# Patient Record
Sex: MALE | Race: BLACK OR AFRICAN AMERICAN | URBAN - METROPOLITAN AREA
[De-identification: names, ages, dates, MRNs, and addresses within clinical notes are randomized per-mention and may not be internally consistent; named-entity substitution may affect disease eponyms.]

---

## 2020-07-31 VITALS
BODY MASS INDEX: 24.64 KG/M2 | OXYGEN SATURATION: 99 % | HEART RATE: 74 BPM | DIASTOLIC BLOOD PRESSURE: 92 MMHG | HEIGHT: 67 IN | SYSTOLIC BLOOD PRESSURE: 160 MMHG | TEMPERATURE: 98.3 F | WEIGHT: 157 LBS

## 2020-07-31 RX ORDER — ATORVASTATIN CALCIUM 40 MG/1
TABLET, FILM COATED ORAL
COMMUNITY
Start: 2020-07-01

## 2020-07-31 RX ORDER — LISINOPRIL 10 MG/1
TABLET ORAL
COMMUNITY
Start: 2020-07-01

## 2020-07-31 RX ORDER — METOPROLOL TARTRATE 50 MG/1
TABLET ORAL
COMMUNITY
Start: 2020-07-01

## 2020-08-03 LAB — CREATININE, EXTERNAL: 1.2

## 2020-08-27 ENCOUNTER — OFFICE VISIT (OUTPATIENT)
Dept: SURGERY | Age: 60
End: 2020-08-27
Payer: COMMERCIAL

## 2020-08-27 VITALS
TEMPERATURE: 97.6 F | HEART RATE: 77 BPM | DIASTOLIC BLOOD PRESSURE: 89 MMHG | HEIGHT: 67 IN | OXYGEN SATURATION: 97 % | SYSTOLIC BLOOD PRESSURE: 117 MMHG | WEIGHT: 149 LBS | BODY MASS INDEX: 23.39 KG/M2

## 2020-08-27 DIAGNOSIS — K40.90 LEFT INGUINAL HERNIA: Primary | ICD-10-CM

## 2020-08-27 PROCEDURE — 99024 POSTOP FOLLOW-UP VISIT: CPT | Performed by: COLON & RECTAL SURGERY

## 2020-08-27 RX ORDER — OXYCODONE AND ACETAMINOPHEN 5; 325 MG/1; MG/1
TABLET ORAL
COMMUNITY
Start: 2020-08-10 | End: 2020-09-24 | Stop reason: ALTCHOICE

## 2020-08-27 NOTE — PROGRESS NOTES
OFFICE VISIT NOTE    Bianca Smith is a 63 y.o. male who presents to the office today for:    Chief Complaint   Patient presents with    Post OP Follow Up     s/p left inguinal hernia repair 8/10/2020   History of present illness:      Mr. Keith Snider is a 71-year-old gentleman who is status post repair of a left inguinal hernia with mesh approximately 2 weeks ago. He complains of some mild discomfort at the surgical site but otherwise is without complaints. He denies any pain in the testicle on that side. He is urinating without difficulty. He denies any abdominal complaints.       Past Medical History:   Diagnosis Date    Hypercholesterolemia     Hypertension        Past Surgical History:   Procedure Laterality Date    HX HERNIA REPAIR  2018    HX HERNIA REPAIR Left 08/10/2020    Inguinal hernia repair with mesh       Family History   Problem Relation Age of Onset    Diabetes Mother        Social History     Socioeconomic History    Marital status: UNKNOWN     Spouse name: Not on file    Number of children: Not on file    Years of education: Not on file    Highest education level: Not on file   Occupational History    Not on file   Social Needs    Financial resource strain: Not on file    Food insecurity     Worry: Not on file     Inability: Not on file   [employer] needs     Medical: Not on file     Non-medical: Not on file   Tobacco Use    Smoking status: Never Smoker    Smokeless tobacco: Never Used   Substance and Sexual Activity    Alcohol use: Not Currently    Drug use: Never    Sexual activity: Not on file   Lifestyle    Physical activity     Days per week: Not on file     Minutes per session: Not on file    Stress: Not on file   Relationships    Social connections     Talks on phone: Not on file     Gets together: Not on file     Attends Hoahaoism service: Not on file     Active member of club or organization: Not on file     Attends meetings of clubs or organizations: Not on file     Relationship status: Not on file    Intimate partner violence     Fear of current or ex partner: Not on file     Emotionally abused: Not on file     Physically abused: Not on file     Forced sexual activity: Not on file   Other Topics Concern    Not on file   Social History Narrative    Not on file       Allergies   Allergen Reactions    Penicillins Shortness of Breath       Current Outpatient Medications   Medication Sig    oxyCODONE-acetaminophen (PERCOCET) 5-325 mg per tablet TAKE 1 TABLET BY MOUTH EVERY 4 HOURS AS NEEDED FOR PAIN    atorvastatin (LIPITOR) 40 mg tablet TAKE 1 TABLET BY MOUTH ONCE DAILY    lisinopriL (PRINIVIL, ZESTRIL) 10 mg tablet TAKE 1 TABLET BY MOUTH ONCE DAILY    metoprolol tartrate (LOPRESSOR) 50 mg tablet TAKE 1 TABLET BY MOUTH TWICE DAILY     No current facility-administered medications for this visit. Review of Systems   Constitutional: Negative. HENT: Negative. Eyes: Negative. Respiratory: Negative. Cardiovascular: Negative. Gastrointestinal: Negative. Genitourinary: Negative. Musculoskeletal: Negative. Skin: Negative. Neurological: Negative. Endo/Heme/Allergies: Negative. Psychiatric/Behavioral: Negative. /89 (BP 1 Location: Left arm, BP Patient Position: Sitting)   Pulse 77   Temp 97.6 °F (36.4 °C) (Temporal)   Ht 5' 7\" (1.702 m)   Wt 149 lb (67.6 kg)   SpO2 97%   BMI 23.34 kg/m²     Physical Exam  Abdominal:      General: Bowel sounds are normal. There is no distension. Palpations: Abdomen is soft. Tenderness: There is no abdominal tenderness. Comments: Left inguinal hernia repair incision is clean and intact. There is no evidence of persistent or recurrent hernia at rest or on Valsalva. The testicle on the left side is freely mobile and equal in size to the right testicle. Problem List Items Addressed This Visit        Other    Left inguinal hernia - Primary          Assessment and Plan:    Overall Mr. Brynn Parker is doing well following his surgery. I advised her to continue to avoid any heavy lifting. He will follow-up with me in 1 month for routine follow-up.           Shen Winn MD

## 2020-09-24 ENCOUNTER — OFFICE VISIT (OUTPATIENT)
Dept: SURGERY | Age: 60
End: 2020-09-24
Payer: COMMERCIAL

## 2020-09-24 VITALS
DIASTOLIC BLOOD PRESSURE: 78 MMHG | WEIGHT: 154 LBS | HEART RATE: 76 BPM | SYSTOLIC BLOOD PRESSURE: 129 MMHG | OXYGEN SATURATION: 98 % | TEMPERATURE: 98.1 F | BODY MASS INDEX: 24.17 KG/M2 | HEIGHT: 67 IN

## 2020-09-24 DIAGNOSIS — K40.90 LEFT INGUINAL HERNIA: Primary | ICD-10-CM

## 2020-09-24 PROCEDURE — 99024 POSTOP FOLLOW-UP VISIT: CPT | Performed by: COLON & RECTAL SURGERY

## 2020-09-24 NOTE — PROGRESS NOTES
OFFICE VISIT NOTE    Margery Cogan is a 63 y.o. male who presents to the office today for follow-up of repair of left inguinal hernia with mesh on August 7, 2020    Chief Complaint   Patient presents with    Follow-up     Left inguinal hernia repair 8/10/2020   History of present illness:    Mr. Ofelia Dukes comes in today for follow-up status post repair of left inguinal hernia on August 10, 2020. He states that he is increasing his activity he does feel some discomfort at the medial aspect of his repair. He is otherwise without complaints. He denies recurrent hernia. He has no abdominal complaints.       Past Medical History:   Diagnosis Date    Hypercholesterolemia     Hypertension        Past Surgical History:   Procedure Laterality Date    HX HERNIA REPAIR  2018    HX HERNIA REPAIR Left 08/10/2020    Inguinal hernia repair with mesh       Family History   Problem Relation Age of Onset    Diabetes Mother        Social History     Socioeconomic History    Marital status: UNKNOWN     Spouse name: Not on file    Number of children: Not on file    Years of education: Not on file    Highest education level: Not on file   Occupational History    Not on file   Social Needs    Financial resource strain: Not on file    Food insecurity     Worry: Not on file     Inability: Not on file   [employer] needs     Medical: Not on file     Non-medical: Not on file   Tobacco Use    Smoking status: Never Smoker    Smokeless tobacco: Never Used   Substance and Sexual Activity    Alcohol use: Not Currently    Drug use: Never    Sexual activity: Not on file   Lifestyle    Physical activity     Days per week: Not on file     Minutes per session: Not on file    Stress: Not on file   Relationships    Social connections     Talks on phone: Not on file     Gets together: Not on file     Attends Restoration service: Not on file     Active member of club or organization: Not on file     Attends meetings of clubs or organizations: Not on file     Relationship status: Not on file    Intimate partner violence     Fear of current or ex partner: Not on file     Emotionally abused: Not on file     Physically abused: Not on file     Forced sexual activity: Not on file   Other Topics Concern    Not on file   Social History Narrative    Not on file       Allergies   Allergen Reactions    Penicillins Shortness of Breath       Current Outpatient Medications   Medication Sig    atorvastatin (LIPITOR) 40 mg tablet TAKE 1 TABLET BY MOUTH ONCE DAILY    lisinopriL (PRINIVIL, ZESTRIL) 10 mg tablet TAKE 1 TABLET BY MOUTH ONCE DAILY    metoprolol tartrate (LOPRESSOR) 50 mg tablet TAKE 1 TABLET BY MOUTH TWICE DAILY     No current facility-administered medications for this visit. Review of Systems   All other systems reviewed and are negative. /78 (BP 1 Location: Left arm, BP Patient Position: Sitting)   Pulse 76   Temp 98.1 °F (36.7 °C) (Oral)   Ht 5' 7\" (1.702 m)   Wt 154 lb (69.9 kg)   SpO2 98%   BMI 24.12 kg/m²     Physical Exam  Abdominal:      Comments: On examination his left inguinal hernia incision has healed nicely. There is no evidence of recurrent hernia at rest or on Valsalva. The testicle is freely mobile on that side. Problem List Items Addressed This Visit        Other    Left inguinal hernia - Primary          Assessment and Plan:  I told Mr. Gilbert Thorne I am pleased he is doing well. He is to return back to work in the first week of November. He is to come back and see me before then to final clearance.             Kiki Milton MD

## 2020-10-19 VITALS
DIASTOLIC BLOOD PRESSURE: 92 MMHG | WEIGHT: 157 LBS | HEIGHT: 67 IN | TEMPERATURE: 98.3 F | BODY MASS INDEX: 24.64 KG/M2 | OXYGEN SATURATION: 99 % | HEART RATE: 74 BPM | SYSTOLIC BLOOD PRESSURE: 160 MMHG

## 2020-10-19 RX ORDER — OXYCODONE AND ACETAMINOPHEN 5; 325 MG/1; MG/1
TABLET ORAL
COMMUNITY
End: 2020-10-29 | Stop reason: ALTCHOICE

## 2020-10-29 ENCOUNTER — OFFICE VISIT (OUTPATIENT)
Dept: SURGERY | Age: 60
End: 2020-10-29
Payer: COMMERCIAL

## 2020-10-29 VITALS
OXYGEN SATURATION: 99 % | BODY MASS INDEX: 25.27 KG/M2 | DIASTOLIC BLOOD PRESSURE: 80 MMHG | SYSTOLIC BLOOD PRESSURE: 133 MMHG | TEMPERATURE: 96.8 F | WEIGHT: 161 LBS | HEART RATE: 74 BPM | HEIGHT: 67 IN

## 2020-10-29 DIAGNOSIS — K40.90 LEFT INGUINAL HERNIA: Primary | ICD-10-CM

## 2020-10-29 PROCEDURE — 99024 POSTOP FOLLOW-UP VISIT: CPT | Performed by: COLON & RECTAL SURGERY

## 2020-10-29 NOTE — PROGRESS NOTES
OFFICE VISIT NOTE    Liv Davis is a 62 y.o. male who presents to the office today for:    Chief Complaint   Patient presents with    Follow-up     Left inguinal hernia repair 8/10/2020       Mr. Clive Hood comes in today for follow-up status post repair of left inguinal hernia August 7, 2020. He had previously had a repair of a right inguinal hernia approximately 3 years ago. In terms of his left inguinal hernia he has no complaints today. He denies any pain at the site. He denies denies any recurrent hernia. He denies any testicular pain.       Past Medical History:   Diagnosis Date    Hypercholesterolemia     Hypertension        Past Surgical History:   Procedure Laterality Date    HX HERNIA REPAIR  2018    HX HERNIA REPAIR Left 08/10/2020    Inguinal hernia repair with mesh    HX HERNIA REPAIR  2018    HX HERNIA REPAIR  08/04/2017       Family History   Problem Relation Age of Onset    Diabetes Mother     Cancer Father     Hypertension Other        Social History     Socioeconomic History    Marital status: UNKNOWN     Spouse name: Not on file    Number of children: Not on file    Years of education: Not on file    Highest education level: Not on file   Occupational History    Not on file   Social Needs    Financial resource strain: Not on file    Food insecurity     Worry: Not on file     Inability: Not on file   [employer] needs     Medical: Not on file     Non-medical: Not on file   Tobacco Use    Smoking status: Never Smoker    Smokeless tobacco: Never Used   Substance and Sexual Activity    Alcohol use: Not Currently    Drug use: Never    Sexual activity: Not on file   Lifestyle    Physical activity     Days per week: Not on file     Minutes per session: Not on file    Stress: Not on file   Relationships    Social connections     Talks on phone: Not on file Gets together: Not on file     Attends Alevism service: Not on file     Active member of club or organization: Not on file     Attends meetings of clubs or organizations: Not on file     Relationship status: Not on file    Intimate partner violence     Fear of current or ex partner: Not on file     Emotionally abused: Not on file     Physically abused: Not on file     Forced sexual activity: Not on file   Other Topics Concern    Not on file   Social History Narrative    Not on file       Allergies   Allergen Reactions    Penicillins Shortness of Breath       Current Outpatient Medications   Medication Sig    atorvastatin (LIPITOR) 40 mg tablet TAKE 1 TABLET BY MOUTH ONCE DAILY    lisinopriL (PRINIVIL, ZESTRIL) 10 mg tablet TAKE 1 TABLET BY MOUTH ONCE DAILY    metoprolol tartrate (LOPRESSOR) 50 mg tablet TAKE 1 TABLET BY MOUTH TWICE DAILY     No current facility-administered medications for this visit. Review of Systems   All other systems reviewed and are negative. /80 (BP 1 Location: Left arm, BP Patient Position: Sitting)   Pulse 74   Temp 96.8 °F (36 °C) (Skin)   Ht 5' 7\" (1.702 m)   Wt 161 lb (73 kg)   SpO2 99%   BMI 25.22 kg/m²     Physical Exam  Abdominal:      Comments: Left inguinal hernia incision is healed beautifully. There is no evidence of recurrent hernia either at rest or on Valsalva. Problem List Items Addressed This Visit        Other    Left inguinal hernia - Primary          Assessment and Plan:    Josh Christine is done quite well following his surgery. I have cleared him to return to work on 11/2/2020. He will follow-up with me on an as-needed basis.           Jose Lemus MD

## 2022-03-18 PROBLEM — K40.90 LEFT INGUINAL HERNIA: Status: ACTIVE | Noted: 2020-08-27

## 2023-01-27 ENCOUNTER — HOSPITAL ENCOUNTER (EMERGENCY)
Age: 63
Discharge: HOME | End: 2023-01-27
Attending: EMERGENCY MEDICINE | Admitting: EMERGENCY MEDICINE
Payer: COMMERCIAL

## 2023-01-27 ENCOUNTER — APPOINTMENT (OUTPATIENT)
Dept: GENERAL RADIOLOGY | Age: 63
End: 2023-01-27
Attending: EMERGENCY MEDICINE
Payer: COMMERCIAL

## 2023-01-27 VITALS
OXYGEN SATURATION: 100 % | RESPIRATION RATE: 20 BRPM | HEIGHT: 67 IN | DIASTOLIC BLOOD PRESSURE: 99 MMHG | HEART RATE: 110 BPM | BODY MASS INDEX: 22.13 KG/M2 | TEMPERATURE: 98 F | SYSTOLIC BLOOD PRESSURE: 158 MMHG | WEIGHT: 141 LBS

## 2023-01-27 DIAGNOSIS — R00.2 PALPITATIONS: Primary | ICD-10-CM

## 2023-01-27 DIAGNOSIS — F41.1 ANXIETY STATE: ICD-10-CM

## 2023-01-27 LAB
ALBUMIN SERPL-MCNC: 3.5 G/DL (ref 3.5–5)
ALBUMIN/GLOB SERPL: 0.8 (ref 1.1–2.2)
ALP SERPL-CCNC: 52 U/L (ref 45–117)
ALT SERPL-CCNC: 23 U/L (ref 12–78)
ANION GAP SERPL CALC-SCNC: 6 MMOL/L (ref 5–15)
AST SERPL W P-5'-P-CCNC: 20 U/L (ref 15–37)
ATRIAL RATE: 101 BPM
BASOPHILS # BLD: 0 K/UL (ref 0–0.1)
BASOPHILS NFR BLD: 1 % (ref 0–1)
BILIRUB SERPL-MCNC: 0.6 MG/DL (ref 0.2–1)
BUN SERPL-MCNC: 18 MG/DL (ref 6–20)
BUN/CREAT SERPL: 15 (ref 12–20)
CA-I BLD-MCNC: 9.4 MG/DL (ref 8.5–10.1)
CALCULATED P AXIS, ECG09: 86 DEGREES
CALCULATED R AXIS, ECG10: 86 DEGREES
CALCULATED T AXIS, ECG11: -17 DEGREES
CHLORIDE SERPL-SCNC: 102 MMOL/L (ref 97–108)
CO2 SERPL-SCNC: 25 MMOL/L (ref 21–32)
CREAT SERPL-MCNC: 1.18 MG/DL (ref 0.7–1.3)
DIAGNOSIS, 93000: NORMAL
DIFFERENTIAL METHOD BLD: ABNORMAL
EOSINOPHIL # BLD: 0.1 K/UL (ref 0–0.4)
EOSINOPHIL NFR BLD: 2 % (ref 0–7)
ERYTHROCYTE [DISTWIDTH] IN BLOOD BY AUTOMATED COUNT: 13.2 % (ref 11.5–14.5)
GLOBULIN SER CALC-MCNC: 4.4 G/DL (ref 2–4)
GLUCOSE SERPL-MCNC: 139 MG/DL (ref 65–100)
HCT VFR BLD AUTO: 36.2 % (ref 36.6–50.3)
HGB BLD-MCNC: 11.7 G/DL (ref 12.1–17)
IMM GRANULOCYTES # BLD AUTO: 0 K/UL (ref 0–0.04)
IMM GRANULOCYTES NFR BLD AUTO: 0 % (ref 0–0.5)
LYMPHOCYTES # BLD: 1.7 K/UL (ref 0.8–3.5)
LYMPHOCYTES NFR BLD: 50 % (ref 12–49)
MAGNESIUM SERPL-MCNC: 1.8 MG/DL (ref 1.6–2.4)
MCH RBC QN AUTO: 30.7 PG (ref 26–34)
MCHC RBC AUTO-ENTMCNC: 32.3 G/DL (ref 30–36.5)
MCV RBC AUTO: 95 FL (ref 80–99)
MONOCYTES # BLD: 0.4 K/UL (ref 0–1)
MONOCYTES NFR BLD: 13 % (ref 5–13)
NEUTS SEG # BLD: 1.1 K/UL (ref 1.8–8)
NEUTS SEG NFR BLD: 34 % (ref 32–75)
NRBC # BLD: 0 K/UL (ref 0–0.01)
NRBC BLD-RTO: 0 PER 100 WBC
P-R INTERVAL, ECG05: 135 MS
PLATELET # BLD AUTO: 199 K/UL (ref 150–400)
PMV BLD AUTO: ABNORMAL FL (ref 8.9–12.9)
POTASSIUM SERPL-SCNC: 5 MMOL/L (ref 3.5–5.1)
PROT SERPL-MCNC: 7.9 G/DL (ref 6.4–8.2)
Q-T INTERVAL, ECG07: 320 MS
QRS DURATION, ECG06: 83 MS
QTC CALCULATION (BEZET), ECG08: 415 MS
RBC # BLD AUTO: 3.81 M/UL (ref 4.1–5.7)
SODIUM SERPL-SCNC: 133 MMOL/L (ref 136–145)
TROPONIN-HIGH SENSITIVITY: 10 NG/L (ref 0–76)
VENTRICULAR RATE, ECG03: 101 BPM
WBC # BLD AUTO: 3.2 K/UL (ref 4.1–11.1)

## 2023-01-27 PROCEDURE — 83735 ASSAY OF MAGNESIUM: CPT

## 2023-01-27 PROCEDURE — 71045 X-RAY EXAM CHEST 1 VIEW: CPT

## 2023-01-27 PROCEDURE — 36415 COLL VENOUS BLD VENIPUNCTURE: CPT

## 2023-01-27 PROCEDURE — 99285 EMERGENCY DEPT VISIT HI MDM: CPT

## 2023-01-27 PROCEDURE — 85025 COMPLETE CBC W/AUTO DIFF WBC: CPT

## 2023-01-27 PROCEDURE — 84484 ASSAY OF TROPONIN QUANT: CPT

## 2023-01-27 PROCEDURE — 80053 COMPREHEN METABOLIC PANEL: CPT

## 2023-01-27 PROCEDURE — 85730 THROMBOPLASTIN TIME PARTIAL: CPT

## 2023-01-27 PROCEDURE — 85610 PROTHROMBIN TIME: CPT

## 2023-01-28 NOTE — ED TRIAGE NOTES
Pt reports he ate some spaghetti for dinner and started to have palpitations. Pt reports it felt like a panic attack. Pt reports hx of anxiety.

## 2023-01-28 NOTE — ED PROVIDER NOTES
Patient presents after he had some palpitations and heavy breathing after a spaghetti dinner tonight. Currently his symptoms have resolved. He reports a history of anxiety and panic attacks in the past.     Social History : No alcohol or tobacco       Past Medical History:   Diagnosis Date    Hypercholesterolemia     Hypertension        Past Surgical History:   Procedure Laterality Date    HX HERNIA REPAIR  2018    HX HERNIA REPAIR Left 08/10/2020    Inguinal hernia repair with mesh    HX HERNIA REPAIR  2018    HX HERNIA REPAIR  08/04/2017         Family History:   Problem Relation Age of Onset    Diabetes Mother     Cancer Father     Hypertension Other        Social History     Socioeconomic History    Marital status: SINGLE     Spouse name: Not on file    Number of children: Not on file    Years of education: Not on file    Highest education level: Not on file   Occupational History    Not on file   Tobacco Use    Smoking status: Never    Smokeless tobacco: Never   Substance and Sexual Activity    Alcohol use: Not Currently    Drug use: Never    Sexual activity: Not on file   Other Topics Concern    Not on file   Social History Narrative    Not on file     Social Determinants of Health     Financial Resource Strain: Not on file   Food Insecurity: Not on file   Transportation Needs: Not on file   Physical Activity: Not on file   Stress: Not on file   Social Connections: Not on file   Intimate Partner Violence: Not on file   Housing Stability: Not on file         ALLERGIES: Penicillins    Review of Systems   Constitutional: Negative. HENT: Negative. Eyes: Negative. Respiratory:  Positive for shortness of breath. Cardiovascular:  Positive for palpitations. Endocrine: Negative. Genitourinary: Negative. Musculoskeletal: Negative. Skin: Negative. Allergic/Immunologic: Negative. Neurological: Negative. Hematological: Negative. Psychiatric/Behavioral: Negative.        Vitals:    01/27/23 2151   BP: (!) 158/99   Pulse: (!) 110   Resp: 20   Temp: 98 °F (36.7 °C)   SpO2: 100%   Weight: 64 kg (141 lb)   Height: 5' 7\" (1.702 m)            Physical Exam  Vitals and nursing note reviewed. HENT:      Head: Normocephalic and atraumatic. Cardiovascular:      Rate and Rhythm: Normal rate and regular rhythm. Pulses: Normal pulses. Heart sounds: Normal heart sounds. Pulmonary:      Effort: Pulmonary effort is normal.      Breath sounds: Normal breath sounds. Abdominal:      General: Abdomen is flat. Bowel sounds are normal.      Palpations: Abdomen is soft. Musculoskeletal:         General: Normal range of motion. Cervical back: Normal range of motion and neck supple. Skin:     General: Skin is warm and dry. Neurological:      General: No focal deficit present. Mental Status: He is oriented to person, place, and time. Mental status is at baseline. Psychiatric:         Mood and Affect: Mood normal.         Behavior: Behavior normal.        Medical Decision Making  Amount and/or Complexity of Data Reviewed  Labs: ordered. Radiology: ordered. ECG/medicine tests: ordered. Details: EKG :Sinus tach 101. LVH. Nonspecific ST changes.            Procedures

## 2023-03-03 ENCOUNTER — HOSPITAL ENCOUNTER (EMERGENCY)
Age: 63
Discharge: HOME | End: 2023-03-03
Attending: EMERGENCY MEDICINE
Payer: COMMERCIAL

## 2023-03-03 VITALS
HEART RATE: 98 BPM | HEIGHT: 67 IN | WEIGHT: 146 LBS | SYSTOLIC BLOOD PRESSURE: 143 MMHG | RESPIRATION RATE: 18 BRPM | DIASTOLIC BLOOD PRESSURE: 88 MMHG | BODY MASS INDEX: 22.91 KG/M2 | OXYGEN SATURATION: 99 % | TEMPERATURE: 97.8 F

## 2023-03-03 DIAGNOSIS — F41.1 ANXIETY STATE: ICD-10-CM

## 2023-03-03 DIAGNOSIS — I10 ASYMPTOMATIC HYPERTENSION: Primary | ICD-10-CM

## 2023-03-03 LAB
ATRIAL RATE: 95 BPM
CALCULATED P AXIS, ECG09: 77 DEGREES
CALCULATED R AXIS, ECG10: 73 DEGREES
CALCULATED T AXIS, ECG11: 58 DEGREES
DIAGNOSIS, 93000: NORMAL
P-R INTERVAL, ECG05: 134 MS
Q-T INTERVAL, ECG07: 347 MS
QRS DURATION, ECG06: 84 MS
QTC CALCULATION (BEZET), ECG08: 436 MS
VENTRICULAR RATE, ECG03: 95 BPM

## 2023-03-03 PROCEDURE — 99283 EMERGENCY DEPT VISIT LOW MDM: CPT | Performed by: EMERGENCY MEDICINE

## 2023-03-03 PROCEDURE — 93005 ELECTROCARDIOGRAM TRACING: CPT

## 2023-03-03 RX ORDER — LISINOPRIL AND HYDROCHLOROTHIAZIDE 12.5; 2 MG/1; MG/1
TABLET ORAL DAILY
COMMUNITY

## 2023-03-03 NOTE — Clinical Note
[address]  [facility] EMERGENCY DEPT  [address]  [facility] [id number] [phone]    Work/School Note    Date: 3/3/2023    To Whom It May concern:      Azael Odor was seen and treated today in the emergency room by the following provider(s):  Attending Provider: Bandar Vanessa MD.      Azael Odor is excused from work/school on 03/03/23. He is clear to return to work/school on 03/04/23.         Sincerely,          Erin Ha MD

## 2023-03-04 NOTE — ED PROVIDER NOTES
[facility] EMERGENCY DEPT  EMERGENCY DEPARTMENT ENCOUNTER       Pt Name: Beata Eisenberg  MRN: [id number]  [place] 1960  Date of evaluation: 3/3/2023  Provider: Lamont Rios MD   PCP: Jose Zendejas MD  Note Started: 7:05 PM 3/3/23     CHIEF COMPLAINT       Chief Complaint   Patient presents with    Hypertension        HISTORY OF PRESENT ILLNESS: 1 or more elements      History From: Patient  HPI Limitations : None     Beata Eisenberg is a 65 y.o. male who presents w elevated blood pressure today. [facility] checks blood pressures daily, usually runs 108-363 systolic at home but today was 159/105 .  has felt 'funny' all day. Like he were going to pass out as if he were having a panic attack. No chest pain, sob no numbness, tingling or weakness. Recently started on lisinopril/hctz as a combination 20-12/5. Nursing Notes were all reviewed and agreed with or any disagreements were addressed in the HPI. REVIEW OF SYSTEMS      Review of Systems   Constitutional:  Positive for activity change. Negative for fatigue and fever. HENT:  Negative for congestion and sinus pressure. Respiratory:  Negative for cough, choking, chest tightness, shortness of breath and wheezing. Cardiovascular:  Negative for chest pain, palpitations and leg swelling. Gastrointestinal:  Negative for abdominal pain, nausea and vomiting. Genitourinary:  Negative for difficulty urinating. Musculoskeletal:  Negative for arthralgias, back pain, gait problem and neck pain. Skin:  Negative for rash and wound. Neurological:  Negative for dizziness, numbness and headaches. Psychiatric/Behavioral:  Negative for confusion. Anxious   All other systems reviewed and are negative. Positives and Pertinent negatives as per HPI.     PAST HISTORY     Past Medical History:  Past Medical History:   Diagnosis Date    Hypercholesterolemia     Hypertension        Past Surgical History:  Past Surgical History: Procedure Laterality Date    HX HERNIA REPAIR  2018    HX HERNIA REPAIR Left 08/10/2020    Inguinal hernia repair with mesh    HX HERNIA REPAIR  2018    HX HERNIA REPAIR  08/04/2017       Family History:  Family History   Problem Relation Age of Onset    Diabetes Mother     Cancer Father     Hypertension Other        Social History:  Social History     Tobacco Use    Smoking status: Never    Smokeless tobacco: Never   Substance Use Topics    Alcohol use: Not Currently    Drug use: Never       Allergies: Allergies   Allergen Reactions    Penicillins Shortness of Breath       CURRENT MEDICATIONS      Discharge Medication List as of 3/3/2023  7:24 PM        CONTINUE these medications which have NOT CHANGED    Details   lisinopril-hydroCHLOROthiazide (PRINZIDE, ZESTORETIC) 20-12.5 mg per tablet Take  by mouth daily. , Historical Med      atorvastatin (LIPITOR) 40 mg tablet TAKE 1 TABLET BY MOUTH ONCE DAILY, Historical Med      lisinopriL (PRINIVIL, ZESTRIL) 10 mg tablet TAKE 1 TABLET BY MOUTH ONCE DAILY, Historical Med      metoprolol tartrate (LOPRESSOR) 50 mg tablet TAKE 1 TABLET BY MOUTH TWICE DAILY, Historical Med             SCREENINGS               No data recorded         PHYSICAL EXAM      ED Triage Vitals [03/03/23 1850]   ED Encounter Vitals Group      BP (!) 143/88      Pulse (Heart Rate) 98      Resp Rate 18      Temp 97.8 °F (36.6 °C)      Temp src       O2 Sat (%) 99 %      Weight 146 lb      Height 5' 7\"        Physical Exam  Vitals and nursing note reviewed. Constitutional:       General: He is not in acute distress. Appearance: Normal appearance. He is normal weight. He is not ill-appearing. HENT:      Head: Normocephalic and atraumatic.       Right Ear: External ear normal.      Left Ear: External ear normal.      Nose: Nose normal.      Mouth/Throat:      Mouth: Mucous membranes are moist.   Eyes:      Conjunctiva/sclera: Conjunctivae normal.      Pupils: Pupils are equal, round, and reactive to light. Cardiovascular:      Rate and Rhythm: Normal rate. Pulses: Normal pulses. Pulmonary:      Effort: Pulmonary effort is normal.   Abdominal:      General: Bowel sounds are normal.      Tenderness: There is no abdominal tenderness. There is no guarding. Musculoskeletal:         General: Tenderness and signs of injury present. No swelling or deformity. Normal range of motion. Cervical back: Normal range of motion and neck supple. Right lower leg: No edema. Left lower leg: No edema. Skin:     General: Skin is warm and dry. Capillary Refill: Capillary refill takes less than 2 seconds. Findings: No bruising, lesion or rash. Neurological:      General: No focal deficit present. Mental Status: He is alert and oriented to person, place, and time. Mental status is at baseline. Cranial Nerves: No cranial nerve deficit. Sensory: No sensory deficit. Motor: No weakness. Psychiatric:         Mood and Affect: Mood normal.         Behavior: Behavior normal.         Thought Content: Thought content normal.         Judgment: Judgment normal.      Comments: Anxious affect.            DIAGNOSTIC RESULTS   LABS:     Recent Results (from the past 24 hour(s))   EKG, 12 LEAD, INITIAL    Collection Time: 03/03/23  7:17 PM   Result Value Ref Range    Ventricular Rate 95 BPM    Atrial Rate 95 BPM    P-R Interval 134 ms    QRS Duration 84 ms    Q-T Interval 347 ms    QTC Calculation (Bezet) 436 ms    Calculated P Axis 77 degrees    Calculated R Axis 73 degrees    Calculated T Axis 58 degrees    Diagnosis       Sinus rhythm  Ventricular premature complex  Left atrial enlargement  Probable left ventricular hypertrophy           EKG: NSR with PVC, rate 95bpm.          PROCEDURES   Unless otherwise noted below, none  Procedures     CRITICAL CARE TIME   Not met    EMERGENCY DEPARTMENT COURSE and DIFFERENTIAL DIAGNOSIS/MDM   Vitals:    Vitals:    03/03/23 1850   BP: (!) 143/88 Pulse: 98   Resp: 18   Temp: 97.8 °F (36.6 °C)   SpO2: 99%   Weight: 66.2 kg (146 lb)   Height: 5' 7\" (1.702 m)        Patient was given the following medications:  Medications - No data to display    CONSULTS: (Who and What was discussed)  None    Chronic Conditions:   Past Medical History:   Diagnosis Date    Hypercholesterolemia     Hypertension      anxiety    Social Determinants affecting Dx or Tx: None    Records Reviewed (source and summary of external notes): None    CC/HPI Summary, DDx, ED Course, and Reassessment: Pt arrives to ed with elevaged bp at home. Improved but still slightly elevated from baseline. He denies any symptoms other than stating he has been feeling anxious and wanted it checked here. SDM withpatient we discussed ordering labs, but pt does not feel this is neessary. He feels better after discussing his bp. He agrees to monitor his pressure at home over the weekend and speak with his pmd about his new mediations and any reasons he may need to adjust the dose. Disposition Considerations (Tests not done, Shared Decision Making, Pt Expectation of Test or Tx.): as abve     FINAL IMPRESSION     1. Asymptomatic hypertension    2. Anxiety state          DISPOSITION/PLAN   Discharged    Discharge Note: The patient is stable for discharge home. The signs, symptoms, diagnosis, and discharge instructions have been discussed, understanding conveyed, and agreed upon. The patient is to follow up as recommended or return to ER should their symptoms worsen. PATIENT REFERRED TO:  Follow-up Information       Follow up With Specialties Details Why Contact Info    Particcollin Gama MD Family Medicine Call in 1 day Please call your regular physician to schedule followup within the next 2 days. Tushar Rodgers 9          Please return to this Emergency Department if your symptoms worsen.       Particcollin Gama MD Family Medicine   [place] [id number] [address] [facility] EMERGENCY DEPT Emergency Medicine Go to  As needed, or for any concerns or deteriorations. , if symptoms persist or worsen. [address] [facility]  [phone]              DISCHARGE MEDICATIONS:  Discharge Medication List as of 3/3/2023  7:24 PM            DISCONTINUED MEDICATIONS:  Discharge Medication List as of 3/3/2023  7:24 PM          I am the Primary Clinician of Record. Mariangel Damon MD (electronically signed)    (Please note that parts of this dictation were completed with voice recognition software. Quite often unanticipated grammatical, syntax, homophones, and other interpretive errors are inadvertently transcribed by the computer software. Please disregards these errors.  Please excuse any errors that have escaped final proofreading.)

## 2023-05-23 RX ORDER — METOPROLOL TARTRATE 50 MG/1
1 TABLET, FILM COATED ORAL 2 TIMES DAILY
COMMUNITY
Start: 2020-07-01

## 2023-05-23 RX ORDER — LISINOPRIL 10 MG/1
1 TABLET ORAL DAILY
COMMUNITY
Start: 2020-07-01

## 2023-05-23 RX ORDER — LISINOPRIL AND HYDROCHLOROTHIAZIDE 20; 12.5 MG/1; MG/1
TABLET ORAL DAILY
COMMUNITY

## 2023-05-23 RX ORDER — ATORVASTATIN CALCIUM 40 MG/1
1 TABLET, FILM COATED ORAL DAILY
COMMUNITY
Start: 2020-07-01